# Patient Record
Sex: MALE | Race: WHITE | NOT HISPANIC OR LATINO
[De-identification: names, ages, dates, MRNs, and addresses within clinical notes are randomized per-mention and may not be internally consistent; named-entity substitution may affect disease eponyms.]

---

## 2019-11-22 ENCOUNTER — RESULT REVIEW (OUTPATIENT)
Age: 69
End: 2019-11-22

## 2020-03-23 ENCOUNTER — RESULT REVIEW (OUTPATIENT)
Age: 70
End: 2020-03-23

## 2021-12-29 ENCOUNTER — TRANSCRIPTION ENCOUNTER (OUTPATIENT)
Age: 71
End: 2021-12-29

## 2022-08-11 ENCOUNTER — APPOINTMENT (OUTPATIENT)
Dept: ORTHOPEDIC SURGERY | Facility: CLINIC | Age: 72
End: 2022-08-11

## 2022-08-11 VITALS — BODY MASS INDEX: 28.6 KG/M2 | HEIGHT: 75 IN | WEIGHT: 230 LBS

## 2022-08-11 DIAGNOSIS — Z86.79 PERSONAL HISTORY OF OTHER DISEASES OF THE CIRCULATORY SYSTEM: ICD-10-CM

## 2022-08-11 DIAGNOSIS — Z78.9 OTHER SPECIFIED HEALTH STATUS: ICD-10-CM

## 2022-08-11 DIAGNOSIS — Z85.51 PERSONAL HISTORY OF MALIGNANT NEOPLASM OF BLADDER: ICD-10-CM

## 2022-08-11 DIAGNOSIS — M76.02 GLUTEAL TENDINITIS, LEFT HIP: ICD-10-CM

## 2022-08-11 DIAGNOSIS — Z87.898 PERSONAL HISTORY OF OTHER SPECIFIED CONDITIONS: ICD-10-CM

## 2022-08-11 DIAGNOSIS — F17.200 NICOTINE DEPENDENCE, UNSPECIFIED, UNCOMPLICATED: ICD-10-CM

## 2022-08-11 PROBLEM — Z00.00 ENCOUNTER FOR PREVENTIVE HEALTH EXAMINATION: Status: ACTIVE | Noted: 2022-08-11

## 2022-08-11 PROCEDURE — 99213 OFFICE O/P EST LOW 20 MIN: CPT

## 2022-08-11 PROCEDURE — 73503 X-RAY EXAM HIP UNI 4/> VIEWS: CPT | Mod: LT

## 2022-08-11 NOTE — ASSESSMENT
[FreeTextEntry1] : 71 yo male presenting with left gluteal tendinitis. X-rays revealing moderate hip oa. Patient does not have any groin pain, great ROM\par -Rx PT given today\par -Activities as tolerated\par -Rest, ice, compression, elevation, NSAIDs PRN for pain\par -All questions answered\par -F/u PRN\par

## 2022-08-11 NOTE — HISTORY OF PRESENT ILLNESS
[Gradual] : gradual [8] : 8 [6] : 6 [Burning] : burning [Dull/Aching] : dull/aching [Throbbing] : throbbing [Constant] : constant [Household chores] : household chores [Leisure] : leisure [Sleep] : sleep [Social interactions] : social interactions [Rest] : rest [Retired] : Work status: retired [de-identified] : Patient is here for his left hip. Patient states NKI. Patient states pain started many years ago. Patietn states he would like to discuss JOSE. Patient states the pain is aching and burning. Patient states going up/ down stairs and trying to stand when on his knees is the worst pain. [] : no [FreeTextEntry1] : Left hip [FreeTextEntry3] : many years ago  [FreeTextEntry5] : Patient states NKI [de-identified] : Movement

## 2022-08-11 NOTE — PHYSICAL EXAM
[Left] : left hip [NL (120)] : flexion 120 degrees [NL (30)] : extension 30 degrees [NL (35)] : adduction 35 degrees [NL (45)] : internal rotation 45 degrees [5___] : adduction 5[unfilled]/5 [1+] : posterior tibialis pulse: 1+ [] : patient ambulates without assistive device [AP] : anteroposterior [Lateral] : lateral [Moderate arthritis (Tonnis Grade 2)] : Moderate arthritis (Tonnis Grade 2) [FreeTextEntry8] : ttp over glutes

## 2023-01-06 ENCOUNTER — APPOINTMENT (OUTPATIENT)
Dept: ORTHOPEDIC SURGERY | Facility: CLINIC | Age: 73
End: 2023-01-06
Payer: MEDICARE

## 2023-01-06 PROCEDURE — 20610 DRAIN/INJ JOINT/BURSA W/O US: CPT | Mod: RT

## 2023-01-06 PROCEDURE — 73562 X-RAY EXAM OF KNEE 3: CPT | Mod: FY,RT

## 2023-01-06 PROCEDURE — 99213 OFFICE O/P EST LOW 20 MIN: CPT | Mod: 25

## 2023-01-06 NOTE — ASSESSMENT
[FreeTextEntry1] : 73 yo male presenting with moderate right knee oa with lateral joint space narrowing. Patient's pain is not severe enough to warrant surgery at this time\par -Discussed with patient that when pain becomes severe that he will be indicated for right TKA\par -Advised that patient should use a cane when ambulating to prevent falling\par -Discussed risks, benefits, alternatives of right knee intraarticular CSI, patient tolerated well\par -Discussed with patient that they are unable to have surgery for at least three months after CSI due to increased risk of infection. Patient understands.\par -Rx PT given today\par -Activities as tolerated\par -Rest, ice, compression, elevation, NSAIDs PRN for pain. \par -All questions answered\par -F/u PRN\par

## 2023-01-06 NOTE — PROCEDURE
[FreeTextEntry3] : Large joint injection was performed of the right knee. The indication for this procedure was pain, inflammation and x-ray evidence of Osteoarthritis on this or prior visit. The site was prepped with alcohol. An injection of Betamethasone (Celestone) 1cc of 3mg, Lidocaine 7cc of 1%  was used. Patient tolerated procedure well. Patient was advised to call if redness, pain or fever occur and apply ice for 15 minutes out of every hour for the next 12-24 hours as tolerated. Patient has tried OTC's including aspirin, Ibuprofen, Aleve, etc or prescription NSAIDS, and/or exercises at home and/or physical therapy without satisfactory response, patient had decreased mobility in the joint and the risks benefits, and alternatives have been discussed, and verbal consent was obtained.

## 2023-01-06 NOTE — PHYSICAL EXAM
[de-identified] : Examination of the right knee is as follows:\par INSPECTION: mild effusion, but no ecchymosis, no erythema, no atrophy, no deformities of quad tendon or of patellar tendon\par PALPATION: lateral joint line tenderness, but no palpable mass, no obvious defects, no increased warmth, no calf tenderness\par ROM: flexion 125 degrees, but extension 0 degrees\par STRENGTH: quadriceps 5/5, hamstring 5/5\par TESTING: ligamentously stable\par NEURO: non-antalgic, patient ambulates without assistive device\par \par X-rays of the right knee is as follows: \par Knee 3 view in the anteroposterior, lateral, skyline views: moderate tricompartmental OA with lateral joint space narrowing

## 2023-01-06 NOTE — HISTORY OF PRESENT ILLNESS
[Gradual] : gradual [6] : 6 [0] : 0 [Dull/Aching] : dull/aching [Throbbing] : throbbing [Intermittent] : intermittent [Household chores] : household chores [Leisure] : leisure [Social interactions] : social interactions [Rest] : rest [Retired] : Work status: retired [de-identified] : Patient is here for his right knee. Patient states NKI. Patient states he has had pain for years. Patient states he gets the most pain walking up and down stairs and going from sitting to standing. Patient states he gets stiffness and aching pain in his knee.  [] : Post Surgical Visit: no [FreeTextEntry1] : Right knee [FreeTextEntry3] : many years  [FreeTextEntry5] : Patient states NKI [de-identified] : Movement

## 2023-01-26 ENCOUNTER — APPOINTMENT (OUTPATIENT)
Dept: ORTHOPEDIC SURGERY | Facility: CLINIC | Age: 73
End: 2023-01-26
Payer: MEDICARE

## 2023-01-26 PROCEDURE — 99213 OFFICE O/P EST LOW 20 MIN: CPT | Mod: 25

## 2023-01-26 PROCEDURE — 20610 DRAIN/INJ JOINT/BURSA W/O US: CPT | Mod: RT

## 2023-01-26 NOTE — ASSESSMENT
[FreeTextEntry1] : 71 yo male presenting with moderate right knee oa with lateral joint space narrowing. Patient's pain is not severe enough to warrant surgery at this time\par -Discussed with patient that when pain becomes severe that he will be indicated for right TKA\par -Advised that patient should use a cane when ambulating to prevent falling\par -Discussed risks, benefits, alternatives of right knee intraarticular CSI, patient tolerated well\par -Discussed with patient that they are unable to have surgery for at least three months after CSI due to increased risk of infection. Patient understands.\par -Rx PT given today\par -Activities as tolerated\par -Rest, ice, compression, elevation, NSAIDs PRN for pain. \par -All questions answered\par -F/u 1 week for injection # 2 \par

## 2023-01-26 NOTE — PHYSICAL EXAM
[de-identified] : Examination of the right knee is as follows:\par INSPECTION: mild effusion, but no ecchymosis, no erythema, no atrophy, no deformities of quad tendon or of patellar tendon\par PALPATION: lateral joint line tenderness, but no palpable mass, no obvious defects, no increased warmth, no calf tenderness\par ROM: flexion 125 degrees, but extension 0 degrees\par STRENGTH: quadriceps 5/5, hamstring 5/5\par TESTING: ligamentously stable\par NEURO: non-antalgic, patient ambulates without assistive device\par \par X-rays of the right knee is as follows: \par Knee 3 view in the anteroposterior, lateral, skyline views: moderate tricompartmental OA with lateral joint space narrowing

## 2023-01-26 NOTE — HISTORY OF PRESENT ILLNESS
[de-identified] : Patient is here for his right knee. Patient states NKI. Patient states he has had pain for years. Patient states he gets the most pain walking up and down stairs and going from sitting to standing. Patient states he gets stiffness and aching pain in his knee. Patient had CSI on 1/6/2023 that only helped for 2 weeks. Patient is not ready for TKA. Patient would like to try visco.  [Gradual] : gradual [6] : 6 [0] : 0 [Dull/Aching] : dull/aching [Throbbing] : throbbing [Intermittent] : intermittent [Household chores] : household chores [Leisure] : leisure [Social interactions] : social interactions [Rest] : rest [Retired] : Work status: retired [] : Post Surgical Visit: no [FreeTextEntry1] : Right knee [FreeTextEntry3] : many years  [FreeTextEntry5] : Patient states NKI [de-identified] : Movement

## 2023-01-26 NOTE — PROCEDURE
[FreeTextEntry3] : Large joint injection was performed on the right knee. The indication for this procedure was pain, inflammation, x-ray evidence of Osteoarthritis on this or prior visit and repeat series performed. The site was prepped with alcohol and sterile technique used. An injection of Gelsyn, series #1 was used.  Gelsyn-3 is comprised of 3 weekly injections, each injection is comprised of 16.8 mg/2 mL. Patient tolerated procedure well. Patient was advised to call if redness, pain or fever occur and apply ice for 15 minutes out of every hour for the next 12-24 hours as tolerated. Patient has tried OTC's including aspirin, ibuprofen, Aleve, etc. or prescription NSAIDs, and/or exercises at home and/or physical therapy without satisfactory response, patient had decreased mobility in the joint and the risks, benefits, and alternatives have been discussed, and verbal consent was obtained.

## 2023-02-02 ENCOUNTER — APPOINTMENT (OUTPATIENT)
Dept: ORTHOPEDIC SURGERY | Facility: CLINIC | Age: 73
End: 2023-02-02
Payer: MEDICARE

## 2023-02-02 PROCEDURE — 20610 DRAIN/INJ JOINT/BURSA W/O US: CPT | Mod: RT

## 2023-02-02 NOTE — PROCEDURE
[FreeTextEntry3] : Large joint injection was performed on the right knee. The indication for this procedure was pain, inflammation, x-ray evidence of Osteoarthritis on this or prior visit and repeat series performed. The site was prepped with alcohol and sterile technique used. An injection of Gelsyn, series #2 was used.  Gelsyn-3 is comprised of 3 weekly injections, each injection is comprised of 16.8 mg/2 mL. Patient tolerated procedure well. Patient was advised to call if redness, pain or fever occur and apply ice for 15 minutes out of every hour for the next 12-24 hours as tolerated. Patient has tried OTC's including aspirin, ibuprofen, Aleve, etc. or prescription NSAIDs, and/or exercises at home and/or physical therapy without satisfactory response, patient had decreased mobility in the joint and the risks, benefits, and alternatives have been discussed, and verbal consent was obtained.

## 2023-02-02 NOTE — PHYSICAL EXAM
[de-identified] : Examination of the right knee is as follows:\par INSPECTION: mild effusion, but no ecchymosis, no erythema, no atrophy, no deformities of quad tendon or of patellar tendon\par PALPATION: lateral joint line tenderness, but no palpable mass, no obvious defects, no increased warmth, no calf tenderness\par ROM: flexion 125 degrees, but extension 0 degrees\par STRENGTH: quadriceps 5/5, hamstring 5/5\par TESTING: ligamentously stable\par NEURO: non-antalgic, patient ambulates without assistive device\par \par X-rays of the right knee is as follows: \par Knee 3 view in the anteroposterior, lateral, skyline views: moderate tricompartmental OA with lateral joint space narrowing

## 2023-02-02 NOTE — ASSESSMENT
[FreeTextEntry1] : 71 yo male presenting with moderate right knee oa with lateral joint space narrowing. Patient's pain is not severe enough to warrant surgery at this time\par -Discussed with patient that when pain becomes severe that he will be indicated for right TKA\par -Advised that patient should use a cane when ambulating to prevent falling\par -Discussed risks, benefits, alternatives of right knee intraarticular CSI, patient tolerated well\par -Discussed with patient that they are unable to have surgery for at least three months after CSI due to increased risk of infection. Patient understands.\par -Rx PT given today\par -Activities as tolerated\par -Rest, ice, compression, elevation, NSAIDs PRN for pain. \par -All questions answered\par -F/u 1 week for injection # 3 \par

## 2023-02-02 NOTE — HISTORY OF PRESENT ILLNESS
[Gradual] : gradual [6] : 6 [0] : 0 [Dull/Aching] : dull/aching [Throbbing] : throbbing [Intermittent] : intermittent [Household chores] : household chores [Leisure] : leisure [Social interactions] : social interactions [Rest] : rest [Retired] : Work status: retired [2] : 2 [Gelsyn] : Gelsyn [de-identified] : Patient is here for his right knee injection #2 Gelsyn  [] : Post Surgical Visit: no [FreeTextEntry1] : Right knee [FreeTextEntry3] : many years  [FreeTextEntry5] : Patient states NKI [de-identified] : Movement  [de-identified] : 1/26/2023 [de-identified] : right knee [de-identified] : visco

## 2023-02-09 ENCOUNTER — APPOINTMENT (OUTPATIENT)
Dept: ORTHOPEDIC SURGERY | Facility: CLINIC | Age: 73
End: 2023-02-09
Payer: MEDICARE

## 2023-02-09 PROCEDURE — 20610 DRAIN/INJ JOINT/BURSA W/O US: CPT | Mod: RT

## 2023-02-09 NOTE — HISTORY OF PRESENT ILLNESS
[Gradual] : gradual [6] : 6 [0] : 0 [Dull/Aching] : dull/aching [Throbbing] : throbbing [Intermittent] : intermittent [Household chores] : household chores [Leisure] : leisure [Social interactions] : social interactions [Rest] : rest [Retired] : Work status: retired [2] : 2 [Gelsyn] : Gelsyn [de-identified] : Patient is here for his right knee injection #3 Gelsyn  [] : Post Surgical Visit: no [FreeTextEntry1] : Right knee [FreeTextEntry3] : many years  [FreeTextEntry5] : Patient states NKI [de-identified] : Movement  [de-identified] : 2/2/2023 [de-identified] : right knee [de-identified] : visco

## 2023-02-09 NOTE — PROCEDURE
[FreeTextEntry3] : Large joint injection was performed on the right knee. The indication for this procedure was pain, inflammation, x-ray evidence of Osteoarthritis on this or prior visit and repeat series performed. The site was prepped with alcohol and sterile technique used. An injection of Gelsyn, series #3 was used.  Gelsyn-3 is comprised of 3 weekly injections, each injection is comprised of 16.8 mg/2 mL. Patient tolerated procedure well. Patient was advised to call if redness, pain or fever occur and apply ice for 15 minutes out of every hour for the next 12-24 hours as tolerated. Patient has tried OTC's including aspirin, ibuprofen, Aleve, etc. or prescription NSAIDs, and/or exercises at home and/or physical therapy without satisfactory response, patient had decreased mobility in the joint and the risks, benefits, and alternatives have been discussed, and verbal consent was obtained.

## 2023-02-09 NOTE — PHYSICAL EXAM
[de-identified] : Examination of the right knee is as follows:\par INSPECTION: mild effusion, but no ecchymosis, no erythema, no atrophy, no deformities of quad tendon or of patellar tendon\par PALPATION: lateral joint line tenderness, but no palpable mass, no obvious defects, no increased warmth, no calf tenderness\par ROM: flexion 125 degrees, but extension 0 degrees\par STRENGTH: quadriceps 5/5, hamstring 5/5\par TESTING: ligamentously stable\par NEURO: non-antalgic, patient ambulates without assistive device\par \par X-rays of the right knee is as follows: \par Knee 3 view in the anteroposterior, lateral, skyline views: moderate tricompartmental OA with lateral joint space narrowing

## 2023-06-01 ENCOUNTER — APPOINTMENT (OUTPATIENT)
Dept: ORTHOPEDIC SURGERY | Facility: CLINIC | Age: 73
End: 2023-06-01
Payer: MEDICARE

## 2023-06-01 PROCEDURE — 99213 OFFICE O/P EST LOW 20 MIN: CPT | Mod: 25

## 2023-06-01 PROCEDURE — 20610 DRAIN/INJ JOINT/BURSA W/O US: CPT | Mod: RT

## 2023-06-01 NOTE — PROCEDURE
[FreeTextEntry3] : -Discussed risks, benefits, alternatives of right knee intraarticular CSI, patient tolerated well.  Large joint injection was performed of the right knee. The indication for this procedure was pain, inflammation and x-ray evidence of Osteoarthritis on this or prior visit. The site was prepped with alcohol. An injection of Betamethasone (Celestone) 1cc of 3mg, Lidocaine 5cc of 1%  was used. Patient tolerated procedure well. Patient was advised to call if redness, pain or fever occur and apply ice for 15 minutes out of every hour for the next 12-24 hours as tolerated. Patient has tried OTC's including aspirin, Ibuprofen, Aleve, etc or prescription NSAIDS, and/or exercises at home and/or physical therapy without satisfactory response, patient had decreased mobility in the joint and the risks benefits, and alternatives have been discussed, and verbal consent was obtained.

## 2023-06-01 NOTE — HISTORY OF PRESENT ILLNESS
[Gradual] : gradual [0] : 0 [Dull/Aching] : dull/aching [Throbbing] : throbbing [Intermittent] : intermittent [Household chores] : household chores [Leisure] : leisure [Social interactions] : social interactions [Rest] : rest [Retired] : Work status: retired [3] : 3 [Gelsyn] : Gelsyn [4] : 4 [de-identified] : Patient is here for a follow up for his right knee. Patient had 3rd visco injection on 2/9/2023. Patient is being treated for osteoarthritis. Patient states he gets occasional aching pain and the visco injections help. Patient would like a CSI today.  [] : Post Surgical Visit: no [FreeTextEntry1] : Right knee [FreeTextEntry3] : many years  [FreeTextEntry5] : Patient states NKI [de-identified] : Movement  [de-identified] : 2/9/2023 [de-identified] : right knee [de-identified] : visco

## 2023-06-01 NOTE — PHYSICAL EXAM
[de-identified] : Examination of the right knee is as follows:\par INSPECTION: mild effusion, but no ecchymosis, no erythema, no atrophy, no deformities of quad tendon or of patellar tendon\par PALPATION: lateral joint line tenderness, but no palpable mass, no obvious defects, no increased warmth, no calf tenderness\par ROM: flexion 125 degrees, but extension 0 degrees\par STRENGTH: quadriceps 5/5, hamstring 5/5\par TESTING: ligamentously stable\par NEURO: non-antalgic, patient ambulates without assistive device\par \par X-rays of the right knee is as follows: \par Knee 3 view in the anteroposterior, lateral, skyline views: moderate tricompartmental OA with lateral joint space narrowing

## 2023-06-01 NOTE — ASSESSMENT
[FreeTextEntry1] : 73 yo male presenting with moderate right knee oa with lateral joint space narrowing. Patient's pain is not severe enough to warrant surgery at this time\par -Discussed with patient that when pain becomes severe that he will be indicated for right TKA\par -Advised that patient should use a cane when ambulating to prevent falling\par -Discussed risks, benefits, alternatives of right knee intraarticular CSI, patient tolerated well\par -Discussed with patient that they are unable to have surgery for at least three months after CSI due to increased risk of infection. Patient understands.\par - -Discussed risks, benefits, alternatives of right knee intraarticular CSI, patient tolerated well.\par -Activities as tolerated\par -Rest, ice, compression, elevation, NSAIDs PRN for pain. \par -All questions answered\par -F/u PRN\par \par Entered by Cristi Newby PA-C acting as scribe.\par Dr. Wong Attestation\par The documentation recorded by the scribe, in my presence, accurately reflects the service I, Dr. Wong, personally performed, and the decisions made by me with my edits as appropriate.

## 2023-08-29 ENCOUNTER — APPOINTMENT (OUTPATIENT)
Dept: ORTHOPEDIC SURGERY | Facility: CLINIC | Age: 73
End: 2023-08-29
Payer: MEDICARE

## 2023-08-29 PROCEDURE — 20610 DRAIN/INJ JOINT/BURSA W/O US: CPT | Mod: RT

## 2023-08-29 PROCEDURE — 99213 OFFICE O/P EST LOW 20 MIN: CPT | Mod: 25

## 2023-08-29 NOTE — HISTORY OF PRESENT ILLNESS
[Gradual] : gradual [4] : 4 [0] : 0 [Dull/Aching] : dull/aching [Throbbing] : throbbing [Intermittent] : intermittent [Household chores] : household chores [Leisure] : leisure [Social interactions] : social interactions [Rest] : rest [Retired] : Work status: retired [3] : 3 [Gelsyn] : Gelsyn [de-identified] : Patient is here for a follow up for his right knee. Patient had 3rd visco injection on 6/1/2023. Patient is being treated for osteoarthritis. Patient states he gets occasional aching pain and the visco injections help. Patient would like a CSI today.  [] : Post Surgical Visit: no [FreeTextEntry1] : Right knee [FreeTextEntry3] : many years  [FreeTextEntry5] : Patient states NKI [de-identified] : Movement  [de-identified] : 6/1/2023 [de-identified] : right knee [de-identified] : visco

## 2023-08-29 NOTE — PHYSICAL EXAM
[de-identified] : Examination of the right knee is as follows: INSPECTION: mild effusion, but no ecchymosis, no erythema, no atrophy, no deformities of quad tendon or of patellar tendon PALPATION: lateral joint line tenderness, but no palpable mass, no obvious defects, no increased warmth, no calf tenderness ROM: flexion 125 degrees, but extension 0 degrees STRENGTH: quadriceps 5/5, hamstring 5/5 TESTING: ligamentously stable NEURO: non-antalgic, patient ambulates without assistive device  X-rays of the right knee is as follows:  Knee 3 view in the anteroposterior, lateral, skyline views: moderate tricompartmental OA with lateral joint space narrowing

## 2023-08-29 NOTE — ASSESSMENT
[FreeTextEntry1] : 72 yo male presenting with moderate right knee oa with lateral joint space narrowing. Patient's pain is not severe enough to warrant surgery at this time and would like to continue CSI for today and resume visco in the future. -Discussed with patient that when pain becomes severe that he will be indicated for right TKA -Advised that patient should use a cane when ambulating to prevent falling -Discussed risks, benefits, alternatives of right knee intraarticular CSI, patient tolerated well -Discussed with patient that they are unable to have surgery for at least three months after CSI due to increased risk of infection. Patient understands. - -Discussed risks, benefits, alternatives of right knee intraarticular CSI, patient tolerated well. -Activities as tolerated -Rest, ice, compression, elevation, NSAIDs PRN for pain.  -All questions answered -F/u PRN

## 2023-11-28 ENCOUNTER — APPOINTMENT (OUTPATIENT)
Dept: ORTHOPEDIC SURGERY | Facility: CLINIC | Age: 73
End: 2023-11-28

## 2023-12-05 ENCOUNTER — APPOINTMENT (OUTPATIENT)
Dept: ORTHOPEDIC SURGERY | Facility: CLINIC | Age: 73
End: 2023-12-05
Payer: MEDICARE

## 2023-12-05 VITALS — BODY MASS INDEX: 28.6 KG/M2 | HEIGHT: 75 IN | WEIGHT: 230 LBS

## 2023-12-05 PROCEDURE — 20610 DRAIN/INJ JOINT/BURSA W/O US: CPT | Mod: RT

## 2023-12-12 ENCOUNTER — APPOINTMENT (OUTPATIENT)
Dept: ORTHOPEDIC SURGERY | Facility: CLINIC | Age: 73
End: 2023-12-12
Payer: MEDICARE

## 2023-12-12 PROCEDURE — 20610 DRAIN/INJ JOINT/BURSA W/O US: CPT | Mod: RT

## 2023-12-19 ENCOUNTER — APPOINTMENT (OUTPATIENT)
Dept: ORTHOPEDIC SURGERY | Facility: CLINIC | Age: 73
End: 2023-12-19
Payer: MEDICARE

## 2023-12-19 DIAGNOSIS — M17.11 UNILATERAL PRIMARY OSTEOARTHRITIS, RIGHT KNEE: ICD-10-CM

## 2023-12-19 PROCEDURE — 20610 DRAIN/INJ JOINT/BURSA W/O US: CPT | Mod: RT

## 2023-12-19 NOTE — PHYSICAL EXAM
[de-identified] : Examination of the right knee is as follows: INSPECTION: mild effusion, but no ecchymosis, no erythema, no atrophy, no deformities of quad tendon or of patellar tendon PALPATION: lateral joint line tenderness, but no palpable mass, no obvious defects, no increased warmth, no calf tenderness ROM: flexion 125 degrees, but extension 0 degrees STRENGTH: quadriceps 5/5, hamstring 5/5 TESTING: ligamentously stable NEURO: non-antalgic, patient ambulates without assistive device  X-rays of the right knee is as follows:  Knee 3 view in the anteroposterior, lateral, skyline views: moderate tricompartmental OA with lateral joint space narrowing

## 2023-12-19 NOTE — ASSESSMENT
[FreeTextEntry1] : 74 yo male presenting with moderate right knee oa with lateral joint space narrowing. Patient's pain is not severe enough to warrant surgery at this time and would like to continue CSI for today and resume visco in the future. -Discussed with patient that when pain becomes severe that he will be indicated for right TKA -Advised that patient should use a cane when ambulating to prevent falling -Discussed risks, benefits, alternatives of right knee visco injection, patient tolerated well -Activities as tolerated -Rest, ice, compression, elevation, NSAIDs PRN for pain. -Follow up PRN  Entered by Cristi Newby PA-C acting as scribe. Dr. Wong Attestation The documentation recorded by the scribe, in my presence, accurately reflects the service I, Dr. Wong, personally performed, and the decisions made by me with my edits as appropriate.

## 2023-12-19 NOTE — HISTORY OF PRESENT ILLNESS
[de-identified] : Patient here today for right knee Gelsyn injection #3. [Gradual] : gradual [4] : 4 [0] : 0 [Dull/Aching] : dull/aching [Throbbing] : throbbing [Intermittent] : intermittent [Household chores] : household chores [Leisure] : leisure [Social interactions] : social interactions [Rest] : rest [Retired] : Work status: retired [3] : 3 [Gelsyn] : Gelsyn [] : Post Surgical Visit: no [FreeTextEntry1] : Right knee [FreeTextEntry3] : many years  [FreeTextEntry5] : Patient states NKI [de-identified] : Movement  [de-identified] : 8/29/2023 [de-identified] : Right knee [de-identified] : visco

## 2024-01-02 ENCOUNTER — APPOINTMENT (OUTPATIENT)
Dept: ORTHOPEDIC SURGERY | Facility: CLINIC | Age: 74
End: 2024-01-02
Payer: MEDICARE

## 2024-01-02 VITALS — BODY MASS INDEX: 28.6 KG/M2 | HEIGHT: 75 IN | WEIGHT: 230 LBS

## 2024-01-02 DIAGNOSIS — M46.1 SACROILIITIS, NOT ELSEWHERE CLASSIFIED: ICD-10-CM

## 2024-01-02 DIAGNOSIS — M16.12 UNILATERAL PRIMARY OSTEOARTHRITIS, LEFT HIP: ICD-10-CM

## 2024-01-02 PROCEDURE — 99213 OFFICE O/P EST LOW 20 MIN: CPT

## 2024-01-02 PROCEDURE — 73503 X-RAY EXAM HIP UNI 4/> VIEWS: CPT | Mod: LT

## 2024-01-02 NOTE — PHYSICAL EXAM
[NL (30)] : extension 30 degrees [NL (35)] : adduction 35 degrees [NL (45)] : internal rotation 45 degrees [5___] : adduction 5[unfilled]/5 [1+] : posterior tibialis pulse: 1+ [] : patient ambulates without assistive device [Left] : left hip with pelvis [Severe arthritis (Tonnis Grade 3)] : Severe arthritis (Tonnis Grade 3) [FreeTextEntry8] : TTP Left SI joint, no groin or trochanteric tenderness [TWNoteComboBox7] : flexion 110 degrees

## 2024-01-02 NOTE — HISTORY OF PRESENT ILLNESS
[Gradual] : gradual [8] : 8 [1] : 2 [Dull/Aching] : dull/aching [Sharp] : sharp [Throbbing] : throbbing [Intermittent] : intermittent [Household chores] : household chores [Leisure] : leisure [Social interactions] : social interactions [Rest] : rest [Retired] : Work status: retired [de-identified] : Patient here for left hip. Patient states NKI. Patient states he has had pain for many years. Patient states the pain is sharp when weight bearing.  [] : Post Surgical Visit: no [FreeTextEntry1] : Left hip  [FreeTextEntry3] : many years ago  [FreeTextEntry5] : Patient states NKI [de-identified] : Movement

## 2024-01-02 NOTE — ASSESSMENT
[FreeTextEntry1] : 74yo male known to me with prior nonsurgical management of moderate to severe left hip OA and trochanteric bursitis.   Denies pain in those locations today.  Pain localized now in left SI joint.  Advised patient to consult with Dr. Barron, spine specialist for futher evaluation and management.  -Activities as tolerated -Rest, ice, compression, elevation, NSAIDs PRN for pain.  -All questions answered -F/u with dr. barron.  f/u with me prn for left hip djd.  The diagnosis was explained in detail. The potential non-surgical and surgical treatments were reviewed. The relative risks and benefits of each option were considered relative to the patients age, activity level, medical history, symptom severity and previously attempted treatments.  The patient was advised to consult with their primary medical provider prior to initiation of any new medications to reduce the risk of adverse effects specific to their long-term home medications and medical history. The risk of gastrointestinal irritation and kidney injury specific to long-term NSAID use was discussed.

## 2024-01-04 ENCOUNTER — APPOINTMENT (OUTPATIENT)
Dept: ORTHOPEDIC SURGERY | Facility: CLINIC | Age: 74
End: 2024-01-04
Payer: MEDICARE

## 2024-01-04 DIAGNOSIS — M51.37 OTHER INTERVERTEBRAL DISC DEGENERATION, LUMBOSACRAL REGION: ICD-10-CM

## 2024-01-04 DIAGNOSIS — M53.3 SACROCOCCYGEAL DISORDERS, NOT ELSEWHERE CLASSIFIED: ICD-10-CM

## 2024-01-04 DIAGNOSIS — M47.817 SPONDYLOSIS W/OUT MYELOPATHY OR RADICULOPATHY, LUMBOSACRAL REGION: ICD-10-CM

## 2024-01-04 DIAGNOSIS — M51.36 OTHER INTERVERTEBRAL DISC DEGENERATION, LUMBAR REGION: ICD-10-CM

## 2024-01-04 DIAGNOSIS — M48.061 SPINAL STENOSIS, LUMBAR REGION WITHOUT NEUROGENIC CLAUDICATION: ICD-10-CM

## 2024-01-04 DIAGNOSIS — M70.62 TROCHANTERIC BURSITIS, LEFT HIP: ICD-10-CM

## 2024-01-04 PROCEDURE — 99214 OFFICE O/P EST MOD 30 MIN: CPT | Mod: 25

## 2024-01-04 PROCEDURE — 20610 DRAIN/INJ JOINT/BURSA W/O US: CPT | Mod: LT

## 2024-01-04 PROCEDURE — 72100 X-RAY EXAM L-S SPINE 2/3 VWS: CPT | Mod: FY

## 2024-01-04 NOTE — PROCEDURE
[Large Joint Injection] : Large joint injection [Left] : of the left [Greater Trochanteric Bursa] : greater trochanteric bursa [Pain] : pain [Inflammation] : inflammation [X-ray evidence of Osteoarthritis on this or prior visit] : x-ray evidence of Osteoarthritis on this or prior visit [Betadine] : betadine [Ethyl Chloride sprayed topically] : ethyl chloride sprayed topically [Sterile technique used] : sterile technique used [___ cc    1%] : Lidocaine ~Vcc of 1%  [___ cc    40mg] : Triamcinolone (Kenalog) ~Vcc of 40 mg  [] : Patient tolerated procedure well [Call if redness, pain or fever occur] : call if redness, pain or fever occur [Apply ice for 15min out of every hour for the next 12-24 hours as tolerated] : apply ice for 15 minutes out of every hour for the next 12-24 hours as tolerated [Patient was advised to rest the joint(s) for ____ days] : patient was advised to rest the joint(s) for [unfilled] days [Previous OTC use and PT nontherapeutic] : patient has tried OTC's including aspirin, Ibuprofen, Aleve, etc or prescription NSAIDS, and/or exercises at home and/or physical therapy without satisfactory response [Patient had decreased mobility in the joint] : patient had decreased mobility in the joint [Risks, benefits, alternatives discussed / Verbal consent obtained] : the risks benefits, and alternatives have been discussed, and verbal consent was obtained

## 2024-01-04 NOTE — ASSESSMENT
[FreeTextEntry1] : 72 yo male presents today for evaluation of his left hip/SI joint pain. X-ray shows multilevel arthritis. However, patient's pain is localized primarily to his left troch and left SI joint. Recommend troch injection first to see if there is relief. If not, then will proceed with SI joint injections.   - Patient given CSI into L troch today secondary to pain and inflammation. Patient tolerated the procedure well. Advised patient to ice the area well for the next 24 hours.   - Recommend NSAIDs PRN - Recommend heating pad use to decrease muscle spasm - Discussed the importance of home exercises, including but not limited to hamstring stretching and core strengthening   Patient was educated on their diagnosis today. All questions answered and patient expressed understanding.   Follow up in 2 months

## 2024-01-04 NOTE — PHYSICAL EXAM
[TextEntry] : Constitutional: Well groomed and developed.  Respiratory: Normal, unlabored breathing. No use of accessory muscles.  Skin: No rashes or ulcers. Skin warm and dry.  Psychiatric: Oriented to time, place, person and event. No acute distress. Gait: Heel to toe Patient able to walk on toes and heels.    Lumbar spine:  Posture: Normal on coronal and sagittal alignment  AROM:  Flexion 60  Extension 10  Moderate pain with simulated truncal motion   Tenderness:  Thoracic: No tenderness on palpation  Lumbar: No tenderness on palpation  Sacrum/coccyx: no tenderness on palpation  Greater trochanteric bursa:  TTP on the left  PSIS: TTP on the left    Motor:                         R             L LE:                                IS                    5             5 Quad              5             5 TA                  5             5 EHL                5             5 Gastroc         5             5                 R  L DTR: Patella  2+  2+ Achilles  2+  2+  Sensory: Light touch sensation intact T12-S1  Babinski's Sign: Negative bilaterally  Straight leg raise test: Negative bilaterally  RICARDO test: negative bilaterally

## 2024-01-04 NOTE — HISTORY OF PRESENT ILLNESS
[Lower back] : lower back [Gradual] : gradual [7] : 7 [0] : 0 [Sharp] : sharp [Intermittent] : intermittent [Rest] : rest [Meds] : meds [Walking] : walking [Retired] : Work status: retired [de-identified] : Patient presents today with left SI joint pain for years with NKI. Patient saw Dr. Wong for the left hip, had xrays, and was advised to see Dr. Barron. Patient states his pain is localized. Patient states he feels pain when he is applying pressure or walking. Patient admits to taking Tylenol for pain. [] : no [de-identified] : applying pressure

## 2024-02-08 ENCOUNTER — APPOINTMENT (OUTPATIENT)
Dept: ORTHOPEDIC SURGERY | Facility: CLINIC | Age: 74
End: 2024-02-08

## 2024-04-04 ENCOUNTER — APPOINTMENT (OUTPATIENT)
Dept: ORTHOPEDIC SURGERY | Facility: CLINIC | Age: 74
End: 2024-04-04